# Patient Record
(demographics unavailable — no encounter records)

---

## 2025-04-29 NOTE — HISTORY OF PRESENT ILLNESS
[FreeTextEntry1] : Aga Vidal presents for sharp pain on right side. she has no bleeding but said she is pregnant and would be getting prenatal care elsewhere.

## 2025-04-29 NOTE — PHYSICAL EXAM
[Appropriately responsive] : appropriately responsive [Alert] : alert [No Acute Distress] : no acute distress [Soft] : soft [Non-tender] : non-tender [Non-distended] : non-distended [No Lesions] : no lesions [No Mass] : no mass [Oriented x3] : oriented x3 [Labia Majora] : normal [Labia Minora] : normal [Normal] : normal [Uterine Adnexae] : normal [FreeTextEntry6] : gravid: IUP confirmed c/w 6 wk 0 days, normal ges sac and yolk sac and small CRL , FH heard

## 2025-04-29 NOTE — PLAN
[FreeTextEntry1] : here with missed menses for pregnancy confirmation  IUP seen , pos yolk sac and get sac and FH seen at risk due to hx of endometriosis *because we don't take her insurance, will be seeing new OB